# Patient Record
Sex: MALE | Race: WHITE | URBAN - METROPOLITAN AREA
[De-identification: names, ages, dates, MRNs, and addresses within clinical notes are randomized per-mention and may not be internally consistent; named-entity substitution may affect disease eponyms.]

---

## 2017-05-01 ENCOUNTER — TRANSFERRED RECORDS (OUTPATIENT)
Dept: HEALTH INFORMATION MANAGEMENT | Facility: CLINIC | Age: 37
End: 2017-05-01

## 2017-05-08 ENCOUNTER — PRE VISIT (OUTPATIENT)
Dept: ORTHOPEDICS | Facility: CLINIC | Age: 37
End: 2017-05-08

## 2017-05-08 ENCOUNTER — TRANSFERRED RECORDS (OUTPATIENT)
Dept: HEALTH INFORMATION MANAGEMENT | Facility: CLINIC | Age: 37
End: 2017-05-08

## 2017-05-08 NOTE — TELEPHONE ENCOUNTER
1.  Date/reason for appt:  5/10/17   Thigh/Leg    2.  Referring provider:  Dr Bakari Noel    3.  Call to patient (Yes / No - short description):  No, transferred from [facility].  Everything is at [facility] including imaging - recent    4.  Previous care at / records requested from:  [facility]

## 2017-05-09 NOTE — TELEPHONE ENCOUNTER
RN calling and spoke with Medical records at [facility].  The gentleman there in medical records was not able to provide an outside report of MRI of left leg done on 05-01-17.  RN then called and left a voice message with the patient that he would need to hand carry that imaging to his appt with us tomorrow and to call if any concerns.  Imaging in the system is a ankle xr done in 10-24-04.

## 2017-05-09 NOTE — TELEPHONE ENCOUNTER
Spoke to Kasandra, she'll locate/expedite request for records/reports by fax and imaging to be pushed asap

## 2017-05-09 NOTE — TELEPHONE ENCOUNTER
Imaging was done at [facility], faxed request for imaging to be pushed and report faxed asap

## 2017-05-09 NOTE — TELEPHONE ENCOUNTER
Records received from [facility] - forwarded to clinic.    Included:  Office note: 5/8/17     Imaging was pushed to PACS from [facility] - 5/1/17 MRI Femur/Thigh Left - Notified Xiomara to pull.   *Waiting on the radiology report.*

## 2017-05-10 ENCOUNTER — ANESTHESIA (OUTPATIENT)
Dept: SURGERY | Facility: AMBULATORY SURGERY CENTER | Age: 37
End: 2017-05-10

## 2017-05-10 ENCOUNTER — OFFICE VISIT (OUTPATIENT)
Dept: SURGERY | Facility: CLINIC | Age: 37
End: 2017-05-10

## 2017-05-10 ENCOUNTER — OFFICE VISIT (OUTPATIENT)
Dept: ORTHOPEDICS | Facility: CLINIC | Age: 37
End: 2017-05-10

## 2017-05-10 ENCOUNTER — ANESTHESIA EVENT (OUTPATIENT)
Dept: SURGERY | Facility: AMBULATORY SURGERY CENTER | Age: 37
End: 2017-05-10

## 2017-05-10 ENCOUNTER — ALLIED HEALTH/NURSE VISIT (OUTPATIENT)
Dept: SURGERY | Facility: CLINIC | Age: 37
End: 2017-05-10

## 2017-05-10 VITALS
BODY MASS INDEX: 25.77 KG/M2 | HEIGHT: 70 IN | TEMPERATURE: 97.9 F | RESPIRATION RATE: 20 BRPM | SYSTOLIC BLOOD PRESSURE: 125 MMHG | OXYGEN SATURATION: 99 % | WEIGHT: 180 LBS | DIASTOLIC BLOOD PRESSURE: 79 MMHG | HEART RATE: 60 BPM

## 2017-05-10 VITALS — WEIGHT: 180 LBS | BODY MASS INDEX: 25.77 KG/M2 | HEIGHT: 70 IN

## 2017-05-10 DIAGNOSIS — M79.89 SOFT TISSUE MASS: Primary | ICD-10-CM

## 2017-05-10 DIAGNOSIS — Z01.818 PRE-OP EXAMINATION: Primary | ICD-10-CM

## 2017-05-10 DIAGNOSIS — M79.89 SOFT TISSUE MASS: ICD-10-CM

## 2017-05-10 LAB
BASOPHILS # BLD AUTO: 0.1 10E9/L (ref 0–0.2)
BASOPHILS NFR BLD AUTO: 1 %
DIFFERENTIAL METHOD BLD: NORMAL
EOSINOPHIL # BLD AUTO: 0.2 10E9/L (ref 0–0.7)
EOSINOPHIL NFR BLD AUTO: 3.1 %
ERYTHROCYTE [DISTWIDTH] IN BLOOD BY AUTOMATED COUNT: 12.1 % (ref 10–15)
HCT VFR BLD AUTO: 42.3 % (ref 40–53)
HGB BLD-MCNC: 14.3 G/DL (ref 13.3–17.7)
IMM GRANULOCYTES # BLD: 0 10E9/L (ref 0–0.4)
IMM GRANULOCYTES NFR BLD: 0.3 %
LYMPHOCYTES # BLD AUTO: 2.2 10E9/L (ref 0.8–5.3)
LYMPHOCYTES NFR BLD AUTO: 37.4 %
MCH RBC QN AUTO: 31.4 PG (ref 26.5–33)
MCHC RBC AUTO-ENTMCNC: 33.8 G/DL (ref 31.5–36.5)
MCV RBC AUTO: 93 FL (ref 78–100)
MONOCYTES # BLD AUTO: 0.4 10E9/L (ref 0–1.3)
MONOCYTES NFR BLD AUTO: 7 %
NEUTROPHILS # BLD AUTO: 3 10E9/L (ref 1.6–8.3)
NEUTROPHILS NFR BLD AUTO: 51.2 %
NRBC # BLD AUTO: 0 10*3/UL
NRBC BLD AUTO-RTO: 0 /100
PLATELET # BLD AUTO: 262 10E9/L (ref 150–450)
RBC # BLD AUTO: 4.56 10E12/L (ref 4.4–5.9)
WBC # BLD AUTO: 5.9 10E9/L (ref 4–11)

## 2017-05-10 RX ORDER — MULTIPLE VITAMINS W/ MINERALS TAB 9MG-400MCG
1 TAB ORAL DAILY
COMMUNITY

## 2017-05-10 ASSESSMENT — LIFESTYLE VARIABLES: TOBACCO_USE: 0

## 2017-05-10 NOTE — H&P
Pre-Operative H & P     CC:  Preoperative exam to assess for increased cardiopulmonary risk while undergoing surgery and anesthesia.    Date of Encounter: 5/10/2017  Primary Care Physician:  [facility]  Travis Estrada is a 37 year old male who presents for pre-operative H & P in preparation for scheduled for open biopsy with excision of soft tumor mass of the left intramuscular biceps femoris with Dr. Alfaro on 5/11/2017 at [facility].  Mr. Estrada was referred to Dr. Alfaro for left biceps femoris intramuscular soft tissue tumor.  Dr. Alfaro discussed with patient doing serial MRI monitoring of the tumor vs open biopsy with excision of soft tumor mass.  Mr. Estrada would like to proceed with operative management.    Mr. Estrada presents to PAC and reports noticing this mass about 2 years ago.  The mass causes minimal discomfort.  He denies any chest pain, dyspnea, fever, chills, sore throat, cough or change in bowel or bladder function.     PAC referral for risk assessment and optimization of anesthesia with comorbid conditions of: ENT surgery, 2006; episodic migraine that presented with slurred speech, evaluated at [facility], 7/25/2015; and PKR eye surgery ~ 1 year ago.       History is obtained from the patient and electronic health record.     Past Medical History  Past Medical History:   Diagnosis Date     Migraine     2 years ago presented to [facility] >>>evaluated with MRI and Ct for slurred speech >>>found to be migraine.       Past Surgical History  Past Surgical History:   Procedure Laterality Date     EYE SURGERY      One year ago, bilateral PRK surgery.     NOSE SURGERY  2006       Hx of Blood transfusions/reactions: denies     Hx of abnormal bleeding or anti-platelet use: denies    Menstrual history: No LMP for male patient.    Steroid use in the last year: denies    Personal or FH with difficulty with Anesthesia:  denies    Prior to Admission  Medications  Current Outpatient Prescriptions   Medication Sig Dispense Refill     multivitamin, therapeutic with minerals (MULTI-VITAMIN) TABS tablet Take 1 tablet by mouth daily Around 11 am         Allergies  No Known Allergies    Social History  Social History     Social History     Marital status:      Spouse name: Dayana     Number of children: 2     Years of education: N/A     Occupational History           Social History Main Topics     Smoking status: Never Smoker     Smokeless tobacco: Current User     Types: Chew     Alcohol use Yes      Comment: social, 1/week if that.     Drug use: No     Sexual activity: Yes     Partners: Female     Other Topics Concern     Not on file     Social History Narrative       Family History  No family history on file.    RROS/MED HX    ENT/Pulmonary:     (+)other ENT- , . .   (-) tobacco use (Nasal reconstruction 2006.)   Neurologic:     (+)migraines (Isolated episode 2 years ago.  Presented to [facility] with slurred speech.  Was evaluated at  in ED with imagery and found to have migraine..),     Cardiovascular:  - neg cardiovascular ROS   (+) ----. : . . . :. . No previous cardiac testing       METS/Exercise Tolerance: Comment: Work-outs everyday - resistent weight training daily. 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        Infectious Disease:  - neg infectious disease ROS       Malignancy:      - no malignancy   Other:    (+) No chance of pregnancy C-spine cleared: N/A, no H/O Chronic Pain,no other significant disability        Temp: 97.9  F (36.6  C) Temp src: Oral BP: 125/79 Pulse: 60   Resp: 20 SpO2: 99 %         180 lbs 0  "oz  5' 10\"   Body mass index is 25.83 kg/(m^2).       Physical Exam  Constitutional: Awake, alert, cooperative, no apparent distress, and appears stated age.  Eyes: Pupils equal, round and reactive to light, extra ocular muscles intact, sclera clear, conjunctiva normal.  HENT: Normocephalic, oral pharynx with moist mucus membranes, good dentition. No goiter appreciated.   Respiratory: Clear to auscultation bilaterally, no crackles or wheezing.  Cardiovascular: Regular rate and rhythm, normal S1 and S2, and no murmur noted.  Carotids +2, no bruits. No edema. Palpable pulses to radial  DP and PT arteries.   GI: Normal bowel sounds, soft, non-distended, non-tender, no masses palpated, no hepatosplenomegaly.    Lymph/Hematologic: No cervical lymphadenopathy and no supraclavicular lymphadenopathy.  Genitourinary:  na  Skin: Warm and dry.  No rashes at anticipated surgical site.   Musculoskeletal: Full ROM of neck. There is no redness, warmth, or swelling of the joints. Gross motor strength is normal.    Neurologic: Awake, alert, oriented to name, place and time. Cranial nerves II-XII are grossly intact. Gait is normal.   Neuropsychiatric: Calm, cooperative. Normal affect.   Labs  Lab Results   Component Value Date    WBC 5.9 05/10/2017     Lab Results   Component Value Date    RBC 4.56 05/10/2017     Lab Results   Component Value Date    HGB 14.3 05/10/2017     Lab Results   Component Value Date    HCT 42.3 05/10/2017     Lab Results   Component Value Date    MCV 93 05/10/2017     Lab Results   Component Value Date    MCH 31.4 05/10/2017     Lab Results   Component Value Date    MCHC 33.8 05/10/2017     Lab Results   Component Value Date    RDW 12.1 05/10/2017     Lab Results   Component Value Date     05/10/2017       Procedures  MRI Left Femur/thigh 5/1/2017  IMPRESSION:     1.  There is an enhancing ovoid 5 mm lesion within the long head of biceps femoris immediately deep to the indicated area of discomfort in " the mid to distal thigh. Considerations include neurogenic tumor such as neurogenic intramuscular lesion including schwannoma or neurofibroma. Small vascular lesion or other etiologies are also possible.  2.  No other bony, fascial or muscular pathology.        MRI/MRA brain W/O & MRA carotid W/O&W 7/25/2015  FINDINGS:    BRAIN: Midline structures are normal.  No restricted diffusion.  Ventricles and sulci are congruent.  The basilar cisterns are patent.  No mass effect or midline shift.  No abnormal extra-axial fluid.    Central arterial intracranial flow voids are maintained.  The internal auditory canals have a normal morphology.  No mastoid fluid.    HEAD MRA: Anterior and posterior intracranial circulation is patent.  No aneurysm or proximal occlusion.    CERVICAL VESSELS MRA: Conventional anatomy of the aortic arch.  Codominant vertebral arteries supplying a patent basilar artery.  No stenosis or luminal irregularity.  Carotid arteries are widely patent.  No stenosis or luminal irregularity.    Outside records reviewed from: Care Everywhere    ASSESSMENT and PLAN  Travis Estrada is a 37 year old male scheduled to undergo open biopsy with excision of soft tumor mass of the left intramuscular biceps femoris with Dr. Alfaro on 5/11/2017 at [facility].       Cardiology - RCRI : No serious cardiac risks.  0.4 % risk of major adverse cardiac event. METS >4.  Patient works out on a regular basis.  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  Neuro - Episode of slurred speech.  Evaluated at [facility].  Dx with Headache with associated speech deficit, Suspect stress mediated or possible migraine.  HEENT - H/O ENT/nose surgery, 2006.  S/P PK surgery on his eye ~ one year ago.    He has the following specific operative considerations:   - Anesthesia considerations:  Refer to PAC  assessment in anesthesia records  - VTE risk:  0.5%  - CELESTINO # of risks 1/8 = low risk  - Risk of PONV score = 1.  If > 2, anti-emetic intervention recommended.    Arrival time, NPO, shower and medication instructions provided by nursing staff today.  Preparing For Your Surgery handout given.  Patient was discussed with Dr Chamorro. I spent 20 minutes face to face with patient, assessing, examining, and educating.      MAGEN Roberts CNP  Preoperative Assessment Center  [facility]  Phone: [phone]  Fax: [phone]

## 2017-05-10 NOTE — PATIENT INSTRUCTIONS
Preparing for Your Surgery      Name:  Travis Estrada   MRN:  [id number]   :  1980   Today's Date:  5/10/2017     Arriving for surgery:  Surgery date: 17  Surgery time: 350 PM Thursday  Arrival time:200  PM  Please come to:     [facility]  [address] [place] [id number]     Parking is available in front of the [facility] building from 5:30AM to 8:00PM.  -  Proceed to the 5th floor to check into the Ambulatory Surgery Center.              >> There will be patient concierges on the 1st and 5th floor, for assistance or an escort, if you would like.              >> Please call [phone] with any questions.    What can I eat or drink?  -  You may have solid food or milk products until 8 hours prior to your surgery. 0600 AM   -  You may have water, apple juice or 7up/Sprite until 2 hours prior to your surgery. 130 PM    Which medicines can I take?  -  Do NOT take these medications in the morning, the day of surgery:  HOLD MULTIVIT.    -  Please take these medications the day of surgery:  NA    How do I prepare myself?  -  Take two showers: one the night before surgery; and one the morning of surgery.         Use Scrubcare or Hibiclens to wash from neck down.  You may use your own shampoo and conditioner. No other hair products.   -  Do NOT use lotion, powder, deodorant, or antiperspirant the day of your surgery.  -  Do NOT wear any makeup, fingernail polish or jewelry.  -  Begin using Incentive Spirometer 1 week prior to surgery.  Use 4 times per day, up to 5-10 breaths each time.  Bring Incentive Spirometer to hospital.  -Do not bring your own medications to the hospital, except for inhalers and eye drops.  -  Bring your ID and insurance card.    Questions or Concerns:  If you have questions or concerns, please call the  Preoperative Assessment Center, Monday-Friday 7AM-7PM:  [phone]

## 2017-05-10 NOTE — PROGRESS NOTES
"       [facility], Orthopaedic Surgery Consultation    Travis Estrada MRN# [id number]   Age: 37 year old YOB: 1980     Requesting physician: Bob Villegas            Assessment and Plan:   Assessment:  ***     Plan:  ***           History of Present Illness:   37 year old male  chief complaint    Presents for evaluation of left femur soft tissue tumor.  First noticed about 2 years ago, pain is worsening and now constant.  5/10 pain, rest and NSAIDs help, aggravated by movements, walking, standing.  ***    Background history:  DX:  1. ***    TREATMENTS:  1. ***        Current symptoms:  Problem: ***  Onset and duration: ***  Awakens from sleep due to sx's:  {YES / NO:780893:a:\"Yes\"}  Precipitating Injury:  {YES / NO:566013:a:\"Yes\"}    Other joints or sites painful:  {YES / NO:116832:a:\"Yes\"}  Fever: {YES / NO:209071:a:\"Yes\"}  Appetite change or weight loss: {YES / NO:367677:a:\"Yes\"}           Physical Exam:     EXAMINATION pertinent findings:   VITAL SIGNS: Height 1.778 m (5' 10\"), weight 81.6 kg (180 lb).  Body mass index is 25.83 kg/(m^2).  RESP: non labored breathing   ABD: benign   SKIN: grossly normal   LYMPHATIC: grossly normal   NEURO: grossly normal   VASCULAR: satisfactory perfusion of all extremities   MUSCULOSKELETAL:   ***                  Data:   All laboratory data reviewed  All imaging studies reviewed by me       Rubens Dixon MD  [facility] Family Professor  Oncology and Adult Reconstructive Surgery  Dept Orthopaedic Surgery, [facility]  [phone] office, [phone] pager  [email or web address]            DATA for DOCUMENTATION:         Past Medical History:   There is no problem list on file for this patient.    No past medical history on file.    Also see scanned health assessment forms.       Past Surgical History:   No past surgical history on file.         Social History:     Social History     Social History     Marital status:      Spouse name: N/A     " Number of children: N/A     Years of education: N/A     Occupational History     Not on file.     Social History Main Topics     Smoking status: Never Smoker     Smokeless tobacco: Not on file     Alcohol use Not on file     Drug use: Not on file     Sexual activity: Not on file     Other Topics Concern     Not on file     Social History Narrative     No narrative on file            Family History:     No family history on file.         Medications:     Current Outpatient Prescriptions   Medication Sig     multivitamin, therapeutic with minerals (MULTI-VITAMIN) TABS tablet Take 1 tablet by mouth daily     No current facility-administered medications for this visit.               Review of Systems:   A comprehensive 10 point review of systems (constitutional, ENT, cardiac, peripheral vascular, lymphatic, respiratory, GI, , Musculoskeletal, skin, Neurological) was performed and found to be negative except as described in this note.     See intake form completed by patient        Answers for HPI/ROS submitted by the patient on 5/10/2017   General Symptoms: No  Skin Symptoms: Yes  HENT Symptoms: No  EYE SYMPTOMS: No  HEART SYMPTOMS: No  LUNG SYMPTOMS: No  INTESTINAL SYMPTOMS: No  URINARY SYMPTOMS: No  REPRODUCTIVE SYMPTOMS: No  SKELETAL SYMPTOMS: No  BLOOD SYMPTOMS: No  NERVOUS SYSTEM SYMPTOMS: No  MENTAL HEALTH SYMPTOMS: No  Itching: Yes  Rashes: Yes

## 2017-05-10 NOTE — LETTER
5/10/2017       RE: Travis Estrada  [address]  [facility] [address]     Dear Colleague,    Thank you for referring your patient, Travis Estrada, to the [facility] at [facility]. Please see a copy of my visit note below.    I was present with the resident during the history and exam.  I discussed the case with the resident and agree with the findings as documented in the assessment and plan.          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  Patient would like to proceed with operative management.  Nursing team will arrange pre-op and scheduling with Dr Alfaro.            History of Present Illness:   37 year old male  chief complaint  Evaluate for soft tissue mass    Presents for evaluation of left femur soft tissue tumor.  First noticed about 2 years ago, pain is constant, denies worsening.  5/10 pain, rest and NSAIDs helps, aggravated by movements, walking, standing.  Some flare ups, inconsistent, not progressing, cannot feel externally, pain worsens when palpates it.  No medications, tylenol occaisonally, does not inhibit movement, not activity related.  Denies any trauma, accidents.  No wieght loss, sympathy weight.  No recent f/c, occasional night pain, difficulty getting comfortable, difficulty sleeping if going through flare up.    No other joint involvement.    PCP 2 weeks ago first time evaluated it.     , 2 kids at home with wife in [place]  No smoke, occasional etoh              "Physical Exam:     EXAMINATION pertinent findings:   VITAL SIGNS: Height 1.778 m (5' 10\"), weight 81.6 kg (180 lb).  Body mass index is 25.83 kg/(m^2).  RESP: non labored breathing   ABD: benign   SKIN: grossly normal   LYMPHATIC: grossly normal   NEURO: grossly normal   VASCULAR: satisfactory perfusion of all extremities   MUSCULOSKELETAL:   nonantalgic gait pattern  LLE  -integument intact  -unable to palpate mass  -ttp with deep palpation to posterior mid thigh  -CMS intact    -motor intact to ehl/fhl/ta/gsc    -SILT to sp/dp/sural/saph/tibial    -foot wwp, cap refill <2 sec, DP 2+ palpable                   Data:   All laboratory data reviewed  All imaging studies reviewed by me  -MRI 5/1/2017  -small intramuscular signal uptake with long head of biceps femoris, approximately mid thigh.  Possible nerve vs blood vessel in nature.    Malik Mcclellan MD  Orthopaedic Surgery PGY-4  [phone]    DATA for DOCUMENTATION:         Past Medical History:   There is no problem list on file for this patient.    No past medical history on file.    Also see scanned health assessment forms.       Past Surgical History:   No past surgical history on file.         Social History:     Social History     Social History     Marital status:      Spouse name: N/A     Number of children: N/A     Years of education: N/A     Occupational History     Not on file.     Social History Main Topics     Smoking status: Never Smoker     Smokeless tobacco: Not on file     Alcohol use Not on file     Drug use: Not on file     Sexual activity: Not on file     Other Topics Concern     Not on file     Social History Narrative     No narrative on file            Family History:     No family history on file.         Medications:     Current Outpatient Prescriptions   Medication Sig     multivitamin, therapeutic with minerals (MULTI-VITAMIN) TABS tablet Take 1 tablet by mouth daily     No current facility-administered medications for this " visit.               Review of Systems:   A comprehensive 10 point review of systems (constitutional, ENT, cardiac, peripheral vascular, lymphatic, respiratory, GI, , Musculoskeletal, skin, Neurological) was performed and found to be negative except as described in this note.     See intake form completed by patient        Again, thank you for allowing me to participate in the care of your patient.      Sincerely,    Igor Alfaro MD

## 2017-05-10 NOTE — NURSING NOTE
Teaching Flowsheet   Relevant Diagnosis: excision left thigh mass  Teaching Topic: preop     Person(s) involved in teaching:   Patient     Motivation Level:  Asks Questions: Yes  Eager to Learn: Yes  Cooperative: Yes  Receptive (willing/able to accept information): Yes  Any cultural factors/Sabianist beliefs that may influence understanding or compliance? No       Patient demonstrates understanding of the following:  Reason for the appointment, diagnosis and treatment plan: Yes  Knowledge of proper use of medications and conditions for which they are ordered (with special attention to potential side effects or drug interactions): Yes  Which situations necessitate calling provider and whom to contact: Yes       Teaching Concerns Addressed:        Proper use and care of soap[ (medical equip, care aids, etc.): Yes  Nutritional needs and diet plan: Yes  Pain management techniques: Yes  Wound Care: Yes  How and/when to access community resources: Yes     Instructional Materials Used/Given: surgery packet reviewed with patient.  He will obtain H&P here today.  He has no further questions or concerns at this time.     }.

## 2017-05-10 NOTE — PROGRESS NOTES
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  Patient would like to proceed with operative management.  Nursing team will arrange pre-op and scheduling with Dr Alfaro.            History of Present Illness:   37 year old male  chief complaint  Evaluate for soft tissue mass    Presents for evaluation of left femur soft tissue tumor.  First noticed about 2 years ago, pain is constant, denies worsening.  5/10 pain, rest and NSAIDs helps, aggravated by movements, walking, standing.  Some flare ups, inconsistent, not progressing, cannot feel externally, pain worsens when palpates it.  No medications, tylenol occaisonally, does not inhibit movement, not activity related.  Denies any trauma, accidents.  No wieght loss, sympathy weight.  No recent f/c, occasional night pain, difficulty getting comfortable, difficulty sleeping if going through flare up.    No other joint involvement.    PCP 2 weeks ago first time evaluated it.     , 2 kids at home with wife in [place]  No smoke, occasional etoh             Physical Exam:     EXAMINATION pertinent findings:   VITAL SIGNS: Height 1.778 m (5' 10\"), weight 81.6 kg (180 lb).  Body mass index is 25.83 kg/(m^2).  RESP: non labored breathing   ABD: benign   SKIN: grossly normal   LYMPHATIC: grossly normal   NEURO: grossly normal   VASCULAR: satisfactory perfusion of all extremities   MUSCULOSKELETAL:   nonantalgic gait pattern  LLE  -integument intact  -unable to palpate mass  -ttp with deep palpation to posterior mid " thigh  -CMS intact    -motor intact to ehl/fhl/ta/gsc    -SILT to sp/dp/sural/saph/tibial    -foot wwp, cap refill <2 sec, DP 2+ palpable                   Data:   All laboratory data reviewed  All imaging studies reviewed by me  -MRI 5/1/2017  -small intramuscular signal uptake with long head of biceps femoris, approximately mid thigh.  Possible nerve vs blood vessel in nature.    Malik Mcclellan MD  Orthopaedic Surgery PGY-4  [phone]    DATA for DOCUMENTATION:         Past Medical History:   There is no problem list on file for this patient.    No past medical history on file.    Also see scanned health assessment forms.       Past Surgical History:   No past surgical history on file.         Social History:     Social History     Social History     Marital status:      Spouse name: N/A     Number of children: N/A     Years of education: N/A     Occupational History     Not on file.     Social History Main Topics     Smoking status: Never Smoker     Smokeless tobacco: Not on file     Alcohol use Not on file     Drug use: Not on file     Sexual activity: Not on file     Other Topics Concern     Not on file     Social History Narrative     No narrative on file            Family History:     No family history on file.         Medications:     Current Outpatient Prescriptions   Medication Sig     multivitamin, therapeutic with minerals (MULTI-VITAMIN) TABS tablet Take 1 tablet by mouth daily     No current facility-administered medications for this visit.               Review of Systems:   A comprehensive 10 point review of systems (constitutional, ENT, cardiac, peripheral vascular, lymphatic, respiratory, GI, , Musculoskeletal, skin, Neurological) was performed and found to be negative except as described in this note.     See intake form completed by patient        Answers for HPI/ROS submitted by the patient on 5/10/2017   General Symptoms: No  Skin Symptoms: Yes  HENT Symptoms: No  EYE SYMPTOMS:  No  HEART SYMPTOMS: No  LUNG SYMPTOMS: No  INTESTINAL SYMPTOMS: No  URINARY SYMPTOMS: No  REPRODUCTIVE SYMPTOMS: No  SKELETAL SYMPTOMS: No  BLOOD SYMPTOMS: No  NERVOUS SYSTEM SYMPTOMS: No  MENTAL HEALTH SYMPTOMS: No  Itching: Yes  Rashes: Yes

## 2017-05-10 NOTE — ANESTHESIA PREPROCEDURE EVALUATION
Anesthesia Evaluation     . Pt has had prior anesthetic. Type: General    No history of anesthetic complications          ROS/MED HX    ENT/Pulmonary:     (+)other ENT- , . .   (-) tobacco use (Nasal reconstruction 2006.)   Neurologic:     (+)migraines (Isolated episode 2 years ago.  Presented to [facility] with slurred speech.  Was evaluated at  in ED with imagery and found to have migraine..),     Cardiovascular:  - neg cardiovascular ROS   (+) ----. : . . . :. . No previous cardiac testing       METS/Exercise Tolerance: Comment: Work-outs everyday - resistent weight training daily. 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        Infectious Disease:  - neg infectious disease ROS       Malignancy:      - no malignancy   Other:    (+) No chance of pregnancy C-spine cleared: N/A, no H/O Chronic Pain,no other significant disability                    Physical Exam  Normal systems: cardiovascular, pulmonary and dental    Airway   Mallampati: I  TM distance: >3 FB  Neck ROM: full    Dental     Cardiovascular   Rhythm and rate: regular and normal      Pulmonary    breath sounds clear to auscultation    Other findings: MRI Left Femur/thigh 5/1/2017  IMPRESSION:     1.  There is an enhancing ovoid 5 mm lesion within the long head of biceps femoris immediately deep to the indicated area of discomfort in the mid to distal thigh. Considerations include neurogenic tumor such as neurogenic intramuscular lesion including schwannoma or neurofibroma. Small vascular lesion or other etiologies are also possible.  2.  No other bony, fascial or muscular pathology.        MRI/MRA brain W/O & MRA carotid  [facility] 7/25/2015  FINDINGS:    BRAIN: Midline structures are normal.  No restricted diffusion.  Ventricles and sulci are congruent.  The basilar cisterns are patent.  No mass effect or midline shift.  No abnormal extra-axial fluid.    Central arterial intracranial flow voids are maintained.  The internal auditory canals have a normal morphology.  No mastoid fluid.    HEAD MRA: Anterior and posterior intracranial circulation is patent.  No aneurysm or proximal occlusion.    CERVICAL VESSELS MRA: Conventional anatomy of the aortic arch.  Codominant vertebral arteries supplying a patent basilar artery.  No stenosis or luminal irregularity.  Carotid arteries are widely patent.  No stenosis or luminal irregularity.           PAC Discussion and Assessment    ASA Classification: 2  Case is suitable for: ASC  Anesthetic techniques and relevant risks discussed: PAC Recommendations anesthetic techniques: TBD DOS.  Invasive monitoring and risk discussed:   Types:   Possibility and Risk of blood transfusion discussed:   NPO instructions given:   Additional anesthetic preparation and risks discussed:   Needs early admission to pre-op area:   Other:     PAC Resident/NP Anesthesia Assessment:  Travis Estrada is a 37 year old male scheduled for open biopsy with excision of soft tumor mass of the left intramuscular biceps femoris with Dr. Alfaro on 5/11/2017 at [facility].  Mr. Estrada was referred to Dr. Alfaro for left biceps femoris intramuscular soft tissue tumor.  Dr. Alfaro discussed with patient doing serial MRI monitoring of the tumor vs open biopsy with excision of soft tumor mass.      Mr. Estrada presents to PAC and reports noticing this mass about 2 years ago.  The mass causes minimal discomfort.  He denies any chest pain, dyspnea, fever, chills, sore throat, cough or change in bowel or bladder function. He would like to proceed with surgical intervention as above.    PAC referral for risk  assessment and optimization of anesthesia with comorbid conditions of: ENT surgery, 2006; episodic migraine that presented with slurred speech, evaluated at [facility], 7/25/2015; and PKR eye surgery ~ 1 year ago.    Cardiology - RCRI : No serious cardiac risks.  0.4 % risk of major adverse cardiac event. METS >4.  Patient works out on a regular basis.  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  Neuro - Episode of slurred speech.  Evaluated at [facility].  Dx with Headache with associated speech deficit, Suspect stress mediated or possible migraine.  HEENT - H/O ENT/nose surgery, 2006.  S/P PK surgery on his eye ~ one year ago.    He has the following specific operative considerations:   - Anesthesia considerations:  Refer to PAC assessment in anesthesia records  - VTE risk:  0.5%  - CELESTINO # of risks 1/8 = low risk  - Risk of PONV score = 1.  If > 2, anti-emetic intervention recommended.    Arrival time, NPO, shower and medication instructions provided by nursing staff today.  Preparing For Your Surgery handout given.  Patient was discussed with Dr Chamorro. I spent 20 minutes face to face with patient, assessing, examining, and educating.            Reviewed and Signed by PAC Mid-Level Provider/Resident  Mid-Level Provider/Resident: Mercedez US CNP  Date: 5/10/2017  Time: 10:59    Attending Anesthesiologist Anesthesia Assessment:      Reviewed and Signed by PAC Anesthesiologist  Anesthesiologist: [email or web address]  Date: 5/11/17  Time:   Pass/Fail:   Disposition:     PAC Pharmacist Assessment:        Pharmacist:   Date:   Time:      Anesthesia Plan      History & Physical Review  History and physical reviewed and following examination; no interval change.    ASA Status:  1 .    NPO Status:  > 8 hours    Plan for General and LMA with Intravenous and Propofol induction. Maintenance will be Balanced.    PONV  prophylaxis:  Ondansetron (or other 5HT-3) and Droperidol or Haldol       Postoperative Care  Postoperative pain management:  Multi-modal analgesia.      Consents  Anesthetic plan, risks, benefits and alternatives discussed with:  Patient.  Use of blood products discussed: No .   .                          .

## 2017-05-10 NOTE — NURSING NOTE
"Reason For Visit:   Chief Complaint   Patient presents with     Consult     Pt. states that he is here today for Left Femur Tumor. He mention that he noticed the pain bout 2 years ago. The pain is constant and haven't change much. Referring:  CAT [facility]ENRY        Pain Assessment  Patient Currently in Pain: Yes  0-10 Pain Scale: 5  Primary Pain Location: Hip  Pain Orientation: Left  Pain Descriptors: Constant  Alleviating Factors: Rest, NSAIDS  Aggravating Factors: Movement, Walking, Standing               HEIGHT: 5' 10\", WEIGHT: 180 lbs 0 oz, BMI: Body mass index is 25.83 kg/(m^2).      Current Outpatient Prescriptions   Medication Sig Dispense Refill     multivitamin, therapeutic with minerals (MULTI-VITAMIN) TABS tablet Take 1 tablet by mouth daily          No Known Allergies    "

## 2017-05-10 NOTE — MR AVS SNAPSHOT
After Visit Summary   5/10/2017    Travis Estrada    MRN: [id number]           Patient Information     Date Of Birth          1980        Visit Information        Provider Department      5/10/2017 8:30 AM Igor Alfaro MD [facility]        Today's Diagnoses     Soft tissue mass    -  1       Follow-ups after your visit        Your next 10 appointments already scheduled     May 11, 2017   Procedure with Igor Alfaro MD   [facility] ([facility])    [address] [place] [address]  5th [facility] [id number] [phone]           Located in the Clinics and Surgery Center at [address] [place] [address].   parking is very convenient and highly recommended.  is a $6 flat rate fee.  Both  and self parkers should enter the main arrival plaza from [facility]; parking attendants will direct you based on your parking preference.            May 24, 2017  7:45 AM CDT   (Arrive by 7:30 AM)   Return Visit with Igor Alfaro MD   [facility] ([facility])    [address] [place] [address]  4th [facility] [id number] [phone]              Who to contact     Please call your clinic at [phone] to:    Ask questions about your health    Make or cancel appointments    Discuss your medicines    Learn about your test results    Speak to your doctor   If you have compliments or concerns about an experience at your clinic, or if you wish to file a complaint, please contact [facility] Patient Relations at [phone] or email us at [email or web address]         Additional Information About Your Visit        MyChart Information     [employer] is an electronic gateway that provides easy, online access to your medical records. With [employer], you can request a clinic appointment, read your test results, renew  "a prescription or communicate with your care team.     To sign up for MyChart visit the website at [email or web address]   You will be asked to enter the access code listed below, as well as some personal information. Please follow the directions to create your username and password.     Your access code is: [email or web address]  Expires: 2017  6:30 AM     Your access code will  in 90 days. If you need help or a new code, please contact your [facility] or call [phone] for assistance.        Care EveryWhere ID     This is your Care EveryWhere ID. This could be used by other organizations to access your [facility] medical records  [id number]        Your Vitals Were     Height BMI (Body Mass Index)                1.778 m (5' 10\") 25.83 kg/m2           Blood Pressure from Last 3 Encounters:   05/10/17 125/79    Weight from Last 3 Encounters:   05/10/17 81.6 kg (180 lb)   05/10/17 81.6 kg (180 lb)              We Performed the Following     Pooja-Operative Worksheet        Primary Care Provider Office Phone # Fax #    [facility] [phone]       [address]  [facility] [address]        Thank you!     Thank you for choosing [facility]  for your care. Our goal is always to provide you with excellent care. Hearing back from our patients is one way we can continue to improve our services. Please take a few minutes to complete the written survey that you may receive in the mail after your visit with us. Thank you!             Your Updated Medication List - Protect others around you: Learn how to safely use, store and throw away your medicines at www.disposemymeds.org.          This list is accurate as of: 5/10/17  4:55 PM.  Always use your most recent med list.                   Brand Name Dispense Instructions for use    Multi-vitamin Tabs tablet      Take 1 tablet by mouth daily Around 11 am         "

## 2017-05-10 NOTE — MR AVS SNAPSHOT
After Visit Summary   5/10/2017    Travis Estrada    MRN: [id number]           Patient Information     Date Of Birth          1980        Visit Information        Provider Department      5/10/2017 10:30 AM Rn, [facility] Preoperative Assessment [facility]        Care Instructions    Preparing for Your Surgery      Name:  Travis Estrada   MRN:  [id number]   :  1980   Today's Date:  5/10/2017     Arriving for surgery:  Surgery date: 17  Surgery time: 350 PM Thursday  Arrival time:200  PM  Please come to:     [facility]  [address] [place] [id number]     Parking is available in front of the [facility] building from 5:30AM to 8:00PM.  -  Proceed to the 5th floor to check into the Ambulatory Surgery Center.              >> There will be patient concierges on the 1st and 5th floor, for assistance or an escort, if you would like.              >> Please call [phone] with any questions.    What can I eat or drink?  -  You may have solid food or milk products until 8 hours prior to your surgery. 0600 AM   -  You may have water, apple juice or 7up/Sprite until 2 hours prior to your surgery. 130 PM    Which medicines can I take?  -  Do NOT take these medications in the morning, the day of surgery:  HOLD MULTIVIT.    -  Please take these medications the day of surgery:  NA    How do I prepare myself?  -  Take two showers: one the night before surgery; and one the morning of surgery.         Use Scrubcare or Hibiclens to wash from neck down.  You may use your own shampoo and conditioner. No other hair products.   -  Do NOT use lotion, powder, deodorant, or antiperspirant the day of your surgery.  -  Do NOT wear any makeup, fingernail polish or jewelry.  -  Begin using Incentive Spirometer 1 week prior to surgery.  Use 4 times per day, up to 5-10 breaths each time.  Bring Incentive Spirometer to hospital.  -Do not bring  your own medications to the hospital, except for inhalers and eye drops.  -  Bring your ID and insurance card.    Questions or Concerns:  If you have questions or concerns, please call the  Preoperative Assessment Center, Monday-Friday 7AM-7PM:  [phone]                  Follow-ups after your visit        Your next 10 appointments already scheduled     May 10, 2017 11:15 AM CDT   LAB with  LAB   [facility] ([facility])    [address] [address]  [address] [facility] [address] [phone]           Patient must bring picture ID.  Patient should be prepared to give a urine specimen  Please do not eat 10-12 hours before your appointment if you are coming in fasting for labs on lipids, cholesterol, or glucose (sugar).  Pregnant women should follow their Care Team instructions. Water with medications is okay. Do not drink coffee or other fluids.   If you have concerns about taking  your medications, please ask at office or if scheduling via [employer], send a message by clicking on Secure Messaging, Message Your Care Team.            May 24, 2017  7:45 AM CDT   (Arrive by 7:30 AM)   Return Visit with Igor Alfaro MD   [facility] ([facility])    [address] [place] [address]  [address]th [facility] [address] [phone]              Who to contact     Please call your clinic at [phone] to:    Ask questions about your health    Make or cancel appointments    Discuss your medicines    Learn about your test results    Speak to your doctor   If you have compliments or concerns about an experience at your clinic, or if you wish to file a complaint, please contact [facility] Patient Relations at [phone] or email us at [email or web address]         Additional Information About Your Visit        [employer] Information     [employer] is an electronic gateway that provides easy, online access to your  medical records. With [employer], you can request a clinic appointment, read your test results, renew a prescription or communicate with your care team.     To sign up for [employer] visit the website at [email or web address]   You will be asked to enter the access code listed below, as well as some personal information. Please follow the directions to create your username and password.     Your access code is: [email or web address]  Expires: 2017  6:30 AM     Your access code will  in 90 days. If you need help or a new code, please contact your [facility] or call [phone] for assistance.        Care EveryWhere ID     This is your Care EveryWhere ID. This could be used by other organizations to access your [facility] medical records  [id number]         Blood Pressure from Last 3 Encounters:   05/10/17 125/79    Weight from Last 3 Encounters:   05/10/17 81.6 kg (180 lb)   05/10/17 81.6 kg (180 lb)              Today, you had the following     No orders found for display       Primary Care Provider Office Phone # Fax #    [facility] [phone]       [address]  [facility] [address]        Thank you!     Thank you for choosing [facility] PREOPERATIVE ASSESSMENT [place]  for your care. Our goal is always to provide you with excellent care. Hearing back from our patients is one way we can continue to improve our services. Please take a few minutes to complete the written survey that you may receive in the mail after your visit with us. Thank you!             Your Updated Medication List - Protect others around you: Learn how to safely use, store and throw away your medicines at www.disposemymeds.org.          This list is accurate as of: 5/10/17 11:02 AM.  Always use your most recent med list.                   Brand Name Dispense Instructions for use    Multi-vitamin Tabs tablet      Take 1 tablet by mouth daily Around 11 am

## 2017-05-11 ENCOUNTER — SURGERY (OUTPATIENT)
Age: 37
End: 2017-05-11

## 2017-05-11 ENCOUNTER — HOSPITAL ENCOUNTER (OUTPATIENT)
Facility: AMBULATORY SURGERY CENTER | Age: 37
End: 2017-05-11
Attending: ORTHOPAEDIC SURGERY

## 2017-05-11 VITALS
TEMPERATURE: 97.5 F | BODY MASS INDEX: 25.77 KG/M2 | WEIGHT: 180 LBS | HEIGHT: 70 IN | SYSTOLIC BLOOD PRESSURE: 133 MMHG | RESPIRATION RATE: 16 BRPM | DIASTOLIC BLOOD PRESSURE: 77 MMHG | OXYGEN SATURATION: 100 %

## 2017-05-11 DIAGNOSIS — R22.42 MASS OF THIGH, LEFT: Primary | ICD-10-CM

## 2017-05-11 RX ORDER — LIDOCAINE HYDROCHLORIDE 20 MG/ML
INJECTION, SOLUTION INFILTRATION; PERINEURAL PRN
Status: DISCONTINUED | OUTPATIENT
Start: 2017-05-11 | End: 2017-05-11

## 2017-05-11 RX ORDER — BUPIVACAINE HYDROCHLORIDE 2.5 MG/ML
INJECTION, SOLUTION INFILTRATION; PERINEURAL PRN
Status: DISCONTINUED | OUTPATIENT
Start: 2017-05-11 | End: 2017-05-11 | Stop reason: HOSPADM

## 2017-05-11 RX ORDER — GABAPENTIN 300 MG/1
300 CAPSULE ORAL ONCE
Status: COMPLETED | OUTPATIENT
Start: 2017-05-11 | End: 2017-05-11

## 2017-05-11 RX ORDER — PROPOFOL 10 MG/ML
INJECTION, EMULSION INTRAVENOUS PRN
Status: DISCONTINUED | OUTPATIENT
Start: 2017-05-11 | End: 2017-05-11

## 2017-05-11 RX ORDER — MEPERIDINE HYDROCHLORIDE 25 MG/ML
12.5 INJECTION INTRAMUSCULAR; INTRAVENOUS; SUBCUTANEOUS
Status: DISCONTINUED | OUTPATIENT
Start: 2017-05-11 | End: 2017-05-12 | Stop reason: HOSPADM

## 2017-05-11 RX ORDER — LIDOCAINE 40 MG/G
CREAM TOPICAL
Status: DISCONTINUED | OUTPATIENT
Start: 2017-05-11 | End: 2017-05-11 | Stop reason: HOSPADM

## 2017-05-11 RX ORDER — KETAMINE HYDROCHLORIDE 10 MG/ML
INJECTION, SOLUTION INTRAMUSCULAR; INTRAVENOUS PRN
Status: DISCONTINUED | OUTPATIENT
Start: 2017-05-11 | End: 2017-05-11

## 2017-05-11 RX ORDER — NALOXONE HYDROCHLORIDE 0.4 MG/ML
.1-.4 INJECTION, SOLUTION INTRAMUSCULAR; INTRAVENOUS; SUBCUTANEOUS
Status: DISCONTINUED | OUTPATIENT
Start: 2017-05-11 | End: 2017-05-12 | Stop reason: HOSPADM

## 2017-05-11 RX ORDER — ACETAMINOPHEN 500 MG
500-1000 TABLET ORAL
COMMUNITY
End: 2017-05-24

## 2017-05-11 RX ORDER — ACETAMINOPHEN 325 MG/1
975 TABLET ORAL ONCE
Status: COMPLETED | OUTPATIENT
Start: 2017-05-11 | End: 2017-05-11

## 2017-05-11 RX ORDER — SODIUM CHLORIDE, SODIUM LACTATE, POTASSIUM CHLORIDE, CALCIUM CHLORIDE 600; 310; 30; 20 MG/100ML; MG/100ML; MG/100ML; MG/100ML
INJECTION, SOLUTION INTRAVENOUS CONTINUOUS
Status: DISCONTINUED | OUTPATIENT
Start: 2017-05-11 | End: 2017-05-12 | Stop reason: HOSPADM

## 2017-05-11 RX ORDER — CEFAZOLIN SODIUM 1 G/3ML
1 INJECTION, POWDER, FOR SOLUTION INTRAMUSCULAR; INTRAVENOUS SEE ADMIN INSTRUCTIONS
Status: DISCONTINUED | OUTPATIENT
Start: 2017-05-11 | End: 2017-05-11 | Stop reason: HOSPADM

## 2017-05-11 RX ORDER — ONDANSETRON 2 MG/ML
4 INJECTION INTRAMUSCULAR; INTRAVENOUS EVERY 30 MIN PRN
Status: DISCONTINUED | OUTPATIENT
Start: 2017-05-11 | End: 2017-05-12 | Stop reason: HOSPADM

## 2017-05-11 RX ORDER — DEXAMETHASONE SODIUM PHOSPHATE 4 MG/ML
INJECTION, SOLUTION INTRA-ARTICULAR; INTRALESIONAL; INTRAMUSCULAR; INTRAVENOUS; SOFT TISSUE PRN
Status: DISCONTINUED | OUTPATIENT
Start: 2017-05-11 | End: 2017-05-11

## 2017-05-11 RX ORDER — ACETAMINOPHEN 325 MG/1
650 TABLET ORAL
Status: DISCONTINUED | OUTPATIENT
Start: 2017-05-11 | End: 2017-05-12 | Stop reason: HOSPADM

## 2017-05-11 RX ORDER — SODIUM CHLORIDE, SODIUM LACTATE, POTASSIUM CHLORIDE, CALCIUM CHLORIDE 600; 310; 30; 20 MG/100ML; MG/100ML; MG/100ML; MG/100ML
INJECTION, SOLUTION INTRAVENOUS CONTINUOUS
Status: DISCONTINUED | OUTPATIENT
Start: 2017-05-11 | End: 2017-05-11 | Stop reason: HOSPADM

## 2017-05-11 RX ORDER — KETOROLAC TROMETHAMINE 30 MG/ML
INJECTION, SOLUTION INTRAMUSCULAR; INTRAVENOUS PRN
Status: DISCONTINUED | OUTPATIENT
Start: 2017-05-11 | End: 2017-05-11

## 2017-05-11 RX ORDER — OXYCODONE HYDROCHLORIDE 5 MG/1
5-10 TABLET ORAL
Status: DISCONTINUED | OUTPATIENT
Start: 2017-05-11 | End: 2017-05-12 | Stop reason: HOSPADM

## 2017-05-11 RX ORDER — FENTANYL CITRATE 50 UG/ML
25-50 INJECTION, SOLUTION INTRAMUSCULAR; INTRAVENOUS EVERY 5 MIN PRN
Status: DISCONTINUED | OUTPATIENT
Start: 2017-05-11 | End: 2017-05-12 | Stop reason: HOSPADM

## 2017-05-11 RX ORDER — PROPOFOL 10 MG/ML
INJECTION, EMULSION INTRAVENOUS CONTINUOUS PRN
Status: DISCONTINUED | OUTPATIENT
Start: 2017-05-11 | End: 2017-05-11

## 2017-05-11 RX ORDER — ONDANSETRON 4 MG/1
4 TABLET, ORALLY DISINTEGRATING ORAL EVERY 30 MIN PRN
Status: DISCONTINUED | OUTPATIENT
Start: 2017-05-11 | End: 2017-05-12 | Stop reason: HOSPADM

## 2017-05-11 RX ORDER — ONDANSETRON 2 MG/ML
INJECTION INTRAMUSCULAR; INTRAVENOUS PRN
Status: DISCONTINUED | OUTPATIENT
Start: 2017-05-11 | End: 2017-05-11

## 2017-05-11 RX ORDER — ONDANSETRON 4 MG/1
4 TABLET, ORALLY DISINTEGRATING ORAL
Status: DISCONTINUED | OUTPATIENT
Start: 2017-05-11 | End: 2017-05-12 | Stop reason: HOSPADM

## 2017-05-11 RX ORDER — FENTANYL CITRATE 50 UG/ML
INJECTION, SOLUTION INTRAMUSCULAR; INTRAVENOUS PRN
Status: DISCONTINUED | OUTPATIENT
Start: 2017-05-11 | End: 2017-05-11

## 2017-05-11 RX ORDER — OXYCODONE HYDROCHLORIDE 5 MG/1
5-10 TABLET ORAL EVERY 6 HOURS PRN
Qty: 50 TABLET | Refills: 0 | Status: SHIPPED | OUTPATIENT
Start: 2017-05-11 | End: 2017-05-24

## 2017-05-11 RX ADMIN — LIDOCAINE HYDROCHLORIDE 60 MG: 20 INJECTION, SOLUTION INFILTRATION; PERINEURAL at 16:15

## 2017-05-11 RX ADMIN — SODIUM CHLORIDE, SODIUM LACTATE, POTASSIUM CHLORIDE, CALCIUM CHLORIDE: 600; 310; 30; 20 INJECTION, SOLUTION INTRAVENOUS at 13:34

## 2017-05-11 RX ADMIN — KETAMINE HYDROCHLORIDE 50 MG: 10 INJECTION, SOLUTION INTRAMUSCULAR; INTRAVENOUS at 16:15

## 2017-05-11 RX ADMIN — KETOROLAC TROMETHAMINE 30 MG: 30 INJECTION, SOLUTION INTRAMUSCULAR; INTRAVENOUS at 16:30

## 2017-05-11 RX ADMIN — Medication 50 MG: at 16:15

## 2017-05-11 RX ADMIN — GABAPENTIN 300 MG: 300 CAPSULE ORAL at 13:04

## 2017-05-11 RX ADMIN — FENTANYL CITRATE 50 MCG: 50 INJECTION, SOLUTION INTRAMUSCULAR; INTRAVENOUS at 16:35

## 2017-05-11 RX ADMIN — PROPOFOL 180 MCG/KG/MIN: 10 INJECTION, EMULSION INTRAVENOUS at 16:20

## 2017-05-11 RX ADMIN — BUPIVACAINE HYDROCHLORIDE 20 ML: 2.5 INJECTION, SOLUTION INFILTRATION; PERINEURAL at 16:53

## 2017-05-11 RX ADMIN — DEXAMETHASONE SODIUM PHOSPHATE 4 MG: 4 INJECTION, SOLUTION INTRA-ARTICULAR; INTRALESIONAL; INTRAMUSCULAR; INTRAVENOUS; SOFT TISSUE at 16:10

## 2017-05-11 RX ADMIN — ONDANSETRON 4 MG: 2 INJECTION INTRAMUSCULAR; INTRAVENOUS at 16:10

## 2017-05-11 RX ADMIN — ACETAMINOPHEN 975 MG: 325 TABLET ORAL at 13:05

## 2017-05-11 RX ADMIN — PROPOFOL 140 MG: 10 INJECTION, EMULSION INTRAVENOUS at 16:15

## 2017-05-11 RX ADMIN — FENTANYL CITRATE 50 MCG: 50 INJECTION, SOLUTION INTRAMUSCULAR; INTRAVENOUS at 16:12

## 2017-05-11 NOTE — DISCHARGE INSTRUCTIONS
[facility]  Home Care Following Anesthesia  For 24 hours after surgery:  1. Get plenty of rest.  A responsible adult must stay with you for at least 24 hours after you leave the surgery center.  2. Do not drive or use heavy equipment.  If you have weakness or tingling, don't drive or use heavy equipment until this feeling goes away.   3. Do not drink alcohol.   4. Avoid strenuous or risky activities.  Ask for help when climbing stairs.  5. You may feel lightheaded.  IF so, sit for a few minutes before standing.  Have someone help you get up.   6. If you have nausea (feel sick to your stomach): Drink only clear liquids such as apple juice, ginger ale, broth or 7-Up.  Rest may also help.  Be sure to drink enough fluids.  Move to a regular diet as you feel able.   7. You may have a slight fever.  Call the doctor if your fever is over 100 F (37.7 C) (taken under the tongue) or lasts longer than 24 hours.  8. You may have a dry mouth, a sore throat, muscle aches or trouble sleeping. These should go away after 24 hours.  9. Do not make important or legal decisions.               Tips for taking pain medications  To get the best pain relief possible, remember these points:    Take pain medications as directed, before pain becomes severe.    Pain medication can upset your stomach: taking it with food may help.    Constipation is a common side effect of pain medication. Drink plenty of  fluids.    Eat foods high in fiber. Take a stool softener if recommended by your doctor or pharmacist.    Do not drink alcohol, drive or operate machinery while taking pain medications.    Ask about other ways to control pain, such as with heat, ice or relaxation.    Call a doctor for any of the followin. Signs of infection (fever, growing tenderness at the surgery site, a large amount of drainage or bleeding, severe pain, foul-smelling drainage, redness, swelling).  2. It has been over 8 to 10 hours  since surgery and you are still not able to urinate (pass water).  3. Headache for over 24 hours.  4. Numbness, tingling or weakness the day after surgery (if you had spinal anesthesia).  Your doctor is:       Dr. Igor Alfaro, Orthopaedics: [phone]               Or dial [phone] and ask for the resident on call for:  Orthopaedics  For emergency care, call the:  [facility] Emergency Department: [phone] (TTY for hearing impaired: [phone])

## 2017-05-11 NOTE — IP AVS SNAPSHOT
MRN:[id number]                      After Visit Summary   5/11/2017    Travis Estrada    MRN: [id number]           Thank you!     Thank you for choosing [facility] for your care. Our goal is always to provide you with excellent care. Hearing back from our patients is one way we can continue to improve our services. Please take a few minutes to complete the written survey that you may receive in the mail after you visit with us. Thank you!        Patient Information     Date Of Birth          1980        About your hospital stay     You were admitted on:  May 11, 2017 [facility]    You were discharged on:  May 11, 2017       Who to Call     For medical emergencies, please call 911.  For non-urgent questions about your medical care, please call your primary care provider or clinic, [phone]  For questions related to your surgery, please call your surgery clinic        Attending Provider     Provider Specialty    Igor Alfaro MD Orthopaedic Surgery       Primary Care Provider Office Phone # Fax #    [facility] [phone]       [address]  [facility] [address]        After Care Instructions      Diet as Tolerated       Return to diet before surgery, unless instructed otherwise.            Discharge Instructions       Review outpatient procedure discharge instructions with patient as directed by Provider            Dressing Change        IF leaking, remove and redress. Wash hands before and after and use gloves.            Ice to affected area       Ice pack to surgical site every 15 minutes per hour for 24 hours            No driving or operating machinery        until the day after procedure            Notify Provider       CALL YOUR PHYSICIAN IF:  1.  Your pain begins to worsen and is unable to be controlled with your medications.  2.  Excessive redness or drainage of cloudy  or bloody material from the wounds (Clear red tinted fluid and some mild drainage should be expected). Drainage of any kind 5 days after surgery should be reported to the doctor.  3.  You have a temperature elevation greater than 101.5    4.  You have pain, swelling or redness in your calf. You have numbness or weakness in your leg or foot.    You many call your physician at [phone] during business hours.    For after hours or on weekends, you may call the hospital at [phone] and ask for the orthopedic resident on call.            Remove dressing - at 72 hours       OK to shower and get incision wet in 4-5 days.  No soaking in a tub or pool for 2 weeks.            Return to clinic       Return to clinic in 2 weeks            Shower        Cover dressing if dressing is not going to be changed today            Weight bearing - As tolerated                 Your next 10 appointments already scheduled     May 24, 2017  7:45 AM CDT   (Arrive by 7:30 AM)   Return Visit with Igor Alfaro MD   [facility] ([facility])    [address] [place] [address] [phone]              Further instructions from your care team       [facility]  Home Care Following Anesthesia  For 24 hours after surgery:  1. Get plenty of rest.  A responsible adult must stay with you for at least 24 hours after you leave the surgery center.  2. Do not drive or use heavy equipment.  If you have weakness or tingling, don't drive or use heavy equipment until this feeling goes away.   3. Do not drink alcohol.   4. Avoid strenuous or risky activities.  Ask for help when climbing stairs.  5. You may feel lightheaded.  IF so, sit for a few minutes before standing.  Have someone help you get up.   6. If you have nausea (feel sick to your stomach): Drink only clear liquids such as apple juice, ginger ale, broth or 7-Up.  Rest may  also help.  Be sure to drink enough fluids.  Move to a regular diet as you feel able.   7. You may have a slight fever.  Call the doctor if your fever is over 100 F (37.7 C) (taken under the tongue) or lasts longer than 24 hours.  8. You may have a dry mouth, a sore throat, muscle aches or trouble sleeping. These should go away after 24 hours.  9. Do not make important or legal decisions.               Tips for taking pain medications  To get the best pain relief possible, remember these points:    Take pain medications as directed, before pain becomes severe.    Pain medication can upset your stomach: taking it with food may help.    Constipation is a common side effect of pain medication. Drink plenty of  fluids.    Eat foods high in fiber. Take a stool softener if recommended by your doctor or pharmacist.    Do not drink alcohol, drive or operate machinery while taking pain medications.    Ask about other ways to control pain, such as with heat, ice or relaxation.    Call a doctor for any of the followin. Signs of infection (fever, growing tenderness at the surgery site, a large amount of drainage or bleeding, severe pain, foul-smelling drainage, redness, swelling).  2. It has been over 8 to 10 hours since surgery and you are still not able to urinate (pass water).  3. Headache for over 24 hours.  4. Numbness, tingling or weakness the day after surgery (if you had spinal anesthesia).  Your doctor is:       Dr. Igor Alfaro, Orthopaedics: [phone]               Or dial [phone] and ask for the resident on call for:  Orthopaedics  For emergency care, call the:  [facility] Emergency Department: [phone] (TTY for hearing impaired: [phone])                  Pending Results     No orders found from 2017 to 2017.            Admission Information     Date & Time Provider Department Dept. Phone    2017 Igor Alfaro MD [facility] [phone]  "     Your Vitals Were     Blood Pressure Temperature Respirations Height Weight Pulse Oximetry    124/85 97.7  F (36.5  C) (Oral) 18 1.778 m (5' 10\") 81.6 kg (180 lb) 98%    BMI (Body Mass Index)                   25.83 kg/m2           MyChart Information     [employer] is an electronic gateway that provides easy, online access to your medical records. With [employer], you can request a clinic appointment, read your test results, renew a prescription or communicate with your care team.     To sign up for [employer] visit the website at [email or web address]   You will be asked to enter the access code listed below, as well as some personal information. Please follow the directions to create your username and password.     Your access code is: [email or web address]  Expires: 2017  6:30 AM     Your access code will  in 90 days. If you need help or a new code, please contact your [facility] or call [phone] for assistance.        Care EveryWhere ID     This is your Care EveryWhere ID. This could be used by other organizations to access your [place] medical records  [id number]           Review of your medicines      START taking        Dose / Directions    oxyCODONE 5 MG IR tablet   Commonly known as:  ROXICODONE        Dose:  5-10 mg   Take 1-2 tablets (5-10 mg) by mouth every 6 hours as needed for moderate to severe pain   Quantity:  50 tablet   Refills:  0         CONTINUE these medicines which have NOT CHANGED        Dose / Directions    acetaminophen 500 MG tablet   Commonly known as:  TYLENOL        Dose:  500-1000 mg   Take 500-1,000 mg by mouth   Refills:  0       Multi-vitamin Tabs tablet        Dose:  1 tablet   Take 1 tablet by mouth daily Around 11 am   Refills:  0            Where to get your medicines      Some of these will need a paper prescription and others can be bought over the counter. Ask your nurse if you have questions.     Bring a paper prescription for each " of these medications     oxyCODONE 5 MG IR tablet                Protect others around you: Learn how to safely use, store and throw away your medicines at www.disposemymeds.org.             Medication List: This is a list of all your medications and when to take them. Check marks below indicate your daily home schedule. Keep this list as a reference.      Medications           Morning Afternoon Evening Bedtime As Needed    acetaminophen 500 MG tablet   Commonly known as:  TYLENOL   Take 500-1,000 mg by mouth   Last time this was given:  975 mg on 5/11/2017  1:05 PM                                Multi-vitamin Tabs tablet   Take 1 tablet by mouth daily Around 11 am                                oxyCODONE 5 MG IR tablet   Commonly known as:  ROXICODONE   Take 1-2 tablets (5-10 mg) by mouth every 6 hours as needed for moderate to severe pain

## 2017-05-11 NOTE — BRIEF OP NOTE
[facility] Brief Operative Note    Pre-operative diagnosis: Pain   Post-operative diagnosis * No post-op diagnosis entered *   Procedure: Procedure(s):  Excision Left Thigh Mass - Wound Class: I-Clean   Surgeon: Dominic   Assistants(s): Faustino Mcclellan   Estimated blood loss: 5 cc    Specimens: Left posterior thigh mass   Findings: See dictated op note     Malik Mcclellan MD  Orthopaedic Surgery PGY-4  [phone]

## 2017-05-11 NOTE — IP AVS SNAPSHOT
[facility]    [address] [place] [address]    Phone:  [phone]    Fax:  [phone]                                       After Visit Summary   5/11/2017    Travis Estrada    MRN: [id number]           After Visit Summary Signature Page     I have received my discharge instructions, and my questions have been answered. I have discussed any challenges I see with this plan with the nurse or doctor.    ..........................................................................................................................................  Patient/Patient Representative Signature      ..........................................................................................................................................  Patient Representative Print Name and Relationship to Patient    ..................................................               ................................................  Date                                            Time    ..........................................................................................................................................  Reviewed by Signature/Title    ...................................................              ..............................................  Date                                                            Time

## 2017-05-11 NOTE — ANESTHESIA CARE TRANSFER NOTE
Patient: Travis Estrada    Procedure(s):  Excision Left Thigh Mass - Wound Class: I-Clean    Diagnosis: Pain  Diagnosis Additional Information: No value filed.    Anesthesia Type:   General, LMA     Note:  Airway :Face Mask  Patient transferred to:PACU  Comments: To PACU pt. Alert O2 via face mask @ 8 liters. Report given to RN      Vitals: (Last set prior to Anesthesia Care Transfer)    CRNA VITALS  5/11/2017 1639 - 5/11/2017 1709      5/11/2017             Resp Rate (set): 10                Electronically Signed By: MAGEN Givens CRNA  May 11, 2017  5:09 PM

## 2017-05-11 NOTE — ANESTHESIA POSTPROCEDURE EVALUATION
Patient: Travis Estrada    Procedure(s):  Excision Left Thigh Mass - Wound Class: I-Clean    Diagnosis:Pain  Diagnosis Additional Information: No value filed.    Anesthesia Type:  General, LMA    Note:  Anesthesia Post Evaluation    Patient location during evaluation: bedside  Patient participation: Able to participate in evaluation but full recovery from regional anesthesia has not yet occurred and is not anticipated to occur within 48 hours  Level of consciousness: awake  Pain management: adequate  Airway patency: patent  Cardiovascular status: acceptable  Respiratory status: acceptable  Hydration status: acceptable  PONV: controlled     Anesthetic complications: None          Last vitals:  Vitals:    05/11/17 1715 05/11/17 1724 05/11/17 1730   BP: 126/82 130/77 (!) 135/92   Resp: 16 16 16   Temp:  36.3  C (97.3  F)    SpO2: 100% 100% 100%         Electronically Signed By: Leon Lopez MD, MD  May 11, 2017  5:47 PM

## 2017-05-11 NOTE — OP NOTE
DATE OF SURGERY:  2017.      PREOPERATIVE DIAGNOSIS:  Left thigh mass.      POSTOPERATIVE DIAGNOSIS:  Left thigh mass.      PROCEDURE:  Excision of deep left thigh mass, less than 3 cm.      SURGEON:  Igor Cason MD      ASSISTANTS:  Malik Mcclellan MD (R4 Resident), and FRANKLIN Madden.      HISTORY:  This patient has noticed a focally painful mass for several months.  An MRI shows a very small enhancing mass in the hamstring muscle.  He understands the risks of bleeding, infection, pain, numbness, tingling, stiffness, weakness, and limp.      DESCRIPTION OF PROCEDURE:  The patient was taken to the operating room, placed in a supine position and then underwent successful induction of general anesthesia.  He was then turned to the prone position and the left leg was washed and sterilely prepped and draped.  We made an incision over the area, marked by the patient in the preop holding, sharply through the skin and then we divided the subcutaneous tissue with cautery.  We inspected the muscle and once we determined we were over the correct muscle as designated by the MRI, we made a small fascial incision and began to explore the muscle.  I was able to palpate a small mass and I dissected down to it right in the expected area.  The mass was excised with a little bit of surrounding muscle.  We then copiously irrigated, closed the fascia and subcutaneous tissue with Vicryl and the skin with Monocryl followed by Steri-Strips and a sterile dry dressing.  There were no complications and the blood loss was 5 mL.  I was present for all critical portions of the procedure.         IGOR CASON MD             D: 2017 16:46   T: 2017 16:59   MT: al      Name:     PELON TATE   MRN:      [id number]        Account:        [id number]   :      1980           Procedure Date: 2017      Document: [id number]

## 2017-05-16 LAB — COPATH REPORT: NORMAL

## 2017-05-24 ENCOUNTER — OFFICE VISIT (OUTPATIENT)
Dept: ORTHOPEDICS | Facility: CLINIC | Age: 37
End: 2017-05-24

## 2017-05-24 DIAGNOSIS — D18.00 GLOMUS TUMOR: Primary | ICD-10-CM

## 2017-05-24 NOTE — LETTER
5/24/2017       RE: Travis Estrada  [address] [employer] [place] [employer] [facility] [place] [facility] [address]     Dear Colleague,    Thank you for referring your patient, Travis Estrada, to the [facility] at [facility]. Please see a copy of my visit note below.    This 37-year-old man had a mass removed from his left thigh. He is getting better each day. His preoperative pain has largely resolved and he notices now tightness and soreness In the muscle. 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 He will return to see me as needed.    Again, thank you for allowing me to participate in the care of your patient.      Sincerely,    Igor Alfaro MD

## 2017-05-24 NOTE — NURSING NOTE
Reason For Visit:   Chief Complaint   Patient presents with     Surgical Followup      Excision of deep left thigh mass, less than 3 cm, DOS: 5-.        Pain Assessment  Patient Currently in Pain: Yes  0-10 Pain Scale: 4  Primary Pain Location:  (Thigh)  Pain Orientation: Left              Current Outpatient Prescriptions   Medication Sig Dispense Refill     multivitamin, therapeutic with minerals (MULTI-VITAMIN) TABS tablet Take 1 tablet by mouth daily Around 11 am          No Known Allergies

## 2017-05-24 NOTE — PROGRESS NOTES
This 37-year-old man had a mass removed from his left thigh. He is getting better each day. His preoperative pain has largely resolved and he notices now tightness and soreness In the muscle. 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 He will return to see me as needed.

## 2017-05-24 NOTE — MR AVS SNAPSHOT
After Visit Summary   2017    Travis Estrada    MRN: [id number]           Patient Information     Date Of Birth          1980        Visit Information        Provider Department      2017 7:45 AM Igor Alfaro MD [facility]        Today's Diagnoses     Glomus tumor    -  1       Follow-ups after your visit        Who to contact     Please call your clinic at [phone] to:    Ask questions about your health    Make or cancel appointments    Discuss your medicines    Learn about your test results    Speak to your doctor   If you have compliments or concerns about an experience at your clinic, or if you wish to file a complaint, please contact [facility] at [phone] or email us at [email or web address]         Additional Information About Your Visit        MyChart Information     [employer] is an electronic gateway that provides easy, online access to your medical records. With [employer], you can request a clinic appointment, read your test results, renew a prescription or communicate with your care team.     To sign up for [employer] visit the website at [email or web address]   You will be asked to enter the access code listed below, as well as some personal information. Please follow the directions to create your username and password.     Your access code is: [email or web address]  Expires: 2017  6:30 AM     Your access code will  in 90 days. If you need help or a new code, please contact your [facility] or call [phone] for assistance.        Care EveryWhere ID     This is your Care EveryWhere ID. This could be used by other organizations to access your [place] medical records  [id number]         Blood Pressure from Last 3 Encounters:   17 133/77   05/10/17 125/79    Weight from Last 3 Encounters:   17 81.6 kg (180 lb)   05/10/17 81.6 kg (180 lb)    05/10/17 81.6 kg (180 lb)              Today, you had the following     No orders found for display       Primary Care Provider Office Phone # Fax #    [facility] [phone] [address]  [facility] [address]        Thank you!     Thank you for choosing [facility]  for your care. Our goal is always to provide you with excellent care. Hearing back from our patients is one way we can continue to improve our services. Please take a few minutes to complete the written survey that you may receive in the mail after your visit with us. Thank you!             Your Updated Medication List - Protect others around you: Learn how to safely use, store and throw away your medicines at www.disposemymeds.org.          This list is accurate as of: 5/24/17  8:01 AM.  Always use your most recent med list.                   Brand Name Dispense Instructions for use    Multi-vitamin Tabs tablet      Take 1 tablet by mouth daily Around 11 am

## (undated) DEVICE — GLOVE PROTEXIS W/NEU-THERA 7.0  2D73TE70

## (undated) DEVICE — SOL NACL 0.9% IRRIG 1000ML BOTTLE 2F7124

## (undated) DEVICE — BNDG ELASTIC 6"X5YDS UNSTERILE 6611-60

## (undated) DEVICE — DRSG STERI STRIP 1/2X4" R1547

## (undated) DEVICE — SU MONOCRYL 4-0 PS-2 18" UND Y496G

## (undated) DEVICE — DRSG PRIMAPORE 03 1/8X6" 66000318

## (undated) DEVICE — GLOVE PROTEXIS POWDER FREE SMT 7.0  2D72PT70X

## (undated) DEVICE — PREP SKIN SCRUB TRAY 4461A

## (undated) DEVICE — DRAPE STERI U 1015

## (undated) DEVICE — DECANTER VIAL 2006S

## (undated) DEVICE — LINEN ORTHO PACK 5446

## (undated) DEVICE — SPECIMEN CONTAINER 5OZ STERILE 2600SA

## (undated) DEVICE — ESU GROUND PAD ADULT W/CORD E7507

## (undated) DEVICE — BNDG ELASTIC 4"X5YDS UNSTERILE 6611-40

## (undated) DEVICE — GLOVE PROTEXIS BLUE W/NEU-THERA 7.5  2D73EB75

## (undated) DEVICE — PREP DURAPREP 26ML APL 8630

## (undated) DEVICE — DRAIN HEMOVAC RESERVOIR KIT 10FR 1/8" MED 00-2550-002-10

## (undated) DEVICE — PREP POVIDONE IODINE SCRUB 7.5% 120ML

## (undated) DEVICE — DRSG TELFA 3X8" 1238

## (undated) DEVICE — TUBE CULTURE W/SCREW CAP STERILE INSIDE 222-2089-05I

## (undated) DEVICE — PREP DURAPREP REMOVER 4OZ 8611

## (undated) DEVICE — SUCTION MANIFOLD NEPTUNE 2 SYS 4 PORT 0702-020-000

## (undated) DEVICE — SU VICRYL 2-0 SH 27" UND J417H

## (undated) DEVICE — DRSG AQUACEL AG 3.5X6.0" HYDROFIBER 412010

## (undated) RX ORDER — HYALURONATE SODIUM 10 MG/ML
SYRINGE (ML) INTRAARTICULAR
Status: DISPENSED
Start: 2017-05-24

## (undated) RX ORDER — KETAMINE HYDROCHLORIDE 10 MG/ML
INJECTION, SOLUTION INTRAMUSCULAR; INTRAVENOUS
Status: DISPENSED
Start: 2017-05-11

## (undated) RX ORDER — ACETAMINOPHEN 325 MG/1
TABLET ORAL
Status: DISPENSED
Start: 2017-05-11

## (undated) RX ORDER — PROPOFOL 10 MG/ML
INJECTION, EMULSION INTRAVENOUS
Status: DISPENSED
Start: 2017-05-11

## (undated) RX ORDER — DEXAMETHASONE SODIUM PHOSPHATE 4 MG/ML
INJECTION, SOLUTION INTRA-ARTICULAR; INTRALESIONAL; INTRAMUSCULAR; INTRAVENOUS; SOFT TISSUE
Status: DISPENSED
Start: 2017-05-11

## (undated) RX ORDER — ONDANSETRON 2 MG/ML
INJECTION INTRAMUSCULAR; INTRAVENOUS
Status: DISPENSED
Start: 2017-05-11

## (undated) RX ORDER — GABAPENTIN 300 MG/1
CAPSULE ORAL
Status: DISPENSED
Start: 2017-05-11

## (undated) RX ORDER — KETOROLAC TROMETHAMINE 30 MG/ML
INJECTION, SOLUTION INTRAMUSCULAR; INTRAVENOUS
Status: DISPENSED
Start: 2017-05-11

## (undated) RX ORDER — FENTANYL CITRATE 50 UG/ML
INJECTION, SOLUTION INTRAMUSCULAR; INTRAVENOUS
Status: DISPENSED
Start: 2017-05-11